# Patient Record
Sex: MALE | URBAN - METROPOLITAN AREA
[De-identification: names, ages, dates, MRNs, and addresses within clinical notes are randomized per-mention and may not be internally consistent; named-entity substitution may affect disease eponyms.]

---

## 2017-09-12 ENCOUNTER — EMERGENCY (EMERGENCY)
Facility: HOSPITAL | Age: 32
LOS: 1 days | Discharge: HOME | End: 2017-09-12
Attending: EMERGENCY MEDICINE | Admitting: EMERGENCY MEDICINE
Payer: COMMERCIAL

## 2017-09-12 VITALS
TEMPERATURE: 98 F | HEART RATE: 86 BPM | OXYGEN SATURATION: 100 % | DIASTOLIC BLOOD PRESSURE: 86 MMHG | RESPIRATION RATE: 18 BRPM | SYSTOLIC BLOOD PRESSURE: 154 MMHG

## 2017-09-12 LAB
ALBUMIN SERPL ELPH-MCNC: 4.5 G/DL — SIGNIFICANT CHANGE UP (ref 3.3–5)
ALP SERPL-CCNC: 77 U/L — SIGNIFICANT CHANGE UP (ref 40–120)
ALT FLD-CCNC: 115 U/L — HIGH (ref 4–41)
AST SERPL-CCNC: 58 U/L — HIGH (ref 4–40)
BASOPHILS # BLD AUTO: 0.05 K/UL — SIGNIFICANT CHANGE UP (ref 0–0.2)
BASOPHILS NFR BLD AUTO: 0.6 % — SIGNIFICANT CHANGE UP (ref 0–2)
BILIRUB SERPL-MCNC: 0.7 MG/DL — SIGNIFICANT CHANGE UP (ref 0.2–1.2)
BUN SERPL-MCNC: 10 MG/DL — SIGNIFICANT CHANGE UP (ref 7–23)
CALCIUM SERPL-MCNC: 9.1 MG/DL — SIGNIFICANT CHANGE UP (ref 8.4–10.5)
CHLORIDE SERPL-SCNC: 103 MMOL/L — SIGNIFICANT CHANGE UP (ref 98–107)
CO2 SERPL-SCNC: 25 MMOL/L — SIGNIFICANT CHANGE UP (ref 22–31)
CREAT SERPL-MCNC: 1.07 MG/DL — SIGNIFICANT CHANGE UP (ref 0.5–1.3)
EOSINOPHIL # BLD AUTO: 0.04 K/UL — SIGNIFICANT CHANGE UP (ref 0–0.5)
EOSINOPHIL NFR BLD AUTO: 0.5 % — SIGNIFICANT CHANGE UP (ref 0–6)
GLUCOSE SERPL-MCNC: 94 MG/DL — SIGNIFICANT CHANGE UP (ref 70–99)
HCT VFR BLD CALC: 49.4 % — SIGNIFICANT CHANGE UP (ref 39–50)
HGB BLD-MCNC: 16.5 G/DL — SIGNIFICANT CHANGE UP (ref 13–17)
IMM GRANULOCYTES # BLD AUTO: 0.02 # — SIGNIFICANT CHANGE UP
IMM GRANULOCYTES NFR BLD AUTO: 0.2 % — SIGNIFICANT CHANGE UP (ref 0–1.5)
LYMPHOCYTES # BLD AUTO: 1.88 K/UL — SIGNIFICANT CHANGE UP (ref 1–3.3)
LYMPHOCYTES # BLD AUTO: 22.9 % — SIGNIFICANT CHANGE UP (ref 13–44)
MCHC RBC-ENTMCNC: 28.5 PG — SIGNIFICANT CHANGE UP (ref 27–34)
MCHC RBC-ENTMCNC: 33.4 % — SIGNIFICANT CHANGE UP (ref 32–36)
MCV RBC AUTO: 85.3 FL — SIGNIFICANT CHANGE UP (ref 80–100)
MONOCYTES # BLD AUTO: 0.7 K/UL — SIGNIFICANT CHANGE UP (ref 0–0.9)
MONOCYTES NFR BLD AUTO: 8.5 % — SIGNIFICANT CHANGE UP (ref 2–14)
NEUTROPHILS # BLD AUTO: 5.53 K/UL — SIGNIFICANT CHANGE UP (ref 1.8–7.4)
NEUTROPHILS NFR BLD AUTO: 67.3 % — SIGNIFICANT CHANGE UP (ref 43–77)
NRBC # FLD: 0 — SIGNIFICANT CHANGE UP
PLATELET # BLD AUTO: 242 K/UL — SIGNIFICANT CHANGE UP (ref 150–400)
PMV BLD: 10.6 FL — SIGNIFICANT CHANGE UP (ref 7–13)
POTASSIUM SERPL-MCNC: 4.3 MMOL/L — SIGNIFICANT CHANGE UP (ref 3.5–5.3)
POTASSIUM SERPL-SCNC: 4.3 MMOL/L — SIGNIFICANT CHANGE UP (ref 3.5–5.3)
PROT SERPL-MCNC: 7.3 G/DL — SIGNIFICANT CHANGE UP (ref 6–8.3)
RBC # BLD: 5.79 M/UL — SIGNIFICANT CHANGE UP (ref 4.2–5.8)
RBC # FLD: 13 % — SIGNIFICANT CHANGE UP (ref 10.3–14.5)
SODIUM SERPL-SCNC: 142 MMOL/L — SIGNIFICANT CHANGE UP (ref 135–145)
TROPONIN T SERPL-MCNC: < 0.06 NG/ML — SIGNIFICANT CHANGE UP (ref 0–0.06)
WBC # BLD: 8.22 K/UL — SIGNIFICANT CHANGE UP (ref 3.8–10.5)
WBC # FLD AUTO: 8.22 K/UL — SIGNIFICANT CHANGE UP (ref 3.8–10.5)

## 2017-09-12 PROCEDURE — 71020: CPT | Mod: 26

## 2017-09-12 PROCEDURE — 99284 EMERGENCY DEPT VISIT MOD MDM: CPT

## 2017-09-12 RX ORDER — FAMOTIDINE 10 MG/ML
1 INJECTION INTRAVENOUS
Qty: 20 | Refills: 0 | OUTPATIENT
Start: 2017-09-12 | End: 2017-09-22

## 2017-09-12 RX ORDER — FAMOTIDINE 10 MG/ML
20 INJECTION INTRAVENOUS ONCE
Qty: 0 | Refills: 0 | Status: COMPLETED | OUTPATIENT
Start: 2017-09-12 | End: 2017-09-12

## 2017-09-12 RX ADMIN — Medication 30 MILLILITER(S): at 13:40

## 2017-09-12 RX ADMIN — FAMOTIDINE 20 MILLIGRAM(S): 10 INJECTION INTRAVENOUS at 13:40

## 2017-09-12 NOTE — ED PROVIDER NOTE - CARE PLAN
Principal Discharge DX:	Shortness of breath  Instructions for follow-up, activity and diet:	1. FOLLOW UP WITH YOUR PRIMARY DOCTOR WITHIN 5 DAYS.  2. TAKE ALL MEDICATIONS AS DIRECTED.  3. YOU WERE GIVEN COPIES OF YOUR LABS--FOLLOW UP WITH YOUR DOCTOR REGARDING MILD ELEVATION IN YOUR LIVER ENZYMES.  4. RETURN TO THE ER FOR ANY WORSENING SYMPTOMS.

## 2017-09-12 NOTE — ED PROVIDER NOTE - MEDICAL DECISION MAKING DETAILS
2-3 of chest pain and SOB, EKG without ischemic changes; low suspicion for ACS, more consistent with likely GERD/gastritis but will send CE x 1 due to father with MI in early 50s 2-3 of chest pain and SOB, EKG without ischemic changes; low suspicion for ACS, more consistent with likely GERD/gastritis but will send CE x 1 due to father with MI in early 50s; treat for GERD symptoms

## 2017-09-12 NOTE — ED PROVIDER NOTE - OBJECTIVE STATEMENT
33 yo male with no sig PMH in ED with 2-3 weeks of intermittent migratory chest discomfort and epigastric discomfort, associated with SOB.  Currently episode of discomfort has been constant since yesterday.  Pt notes that for the past few days he has noticed increased symptoms of what he thinks are acid reflux symptoms.  Symptoms are not related to food intake, not positional.  Symptoms non-exertional, no associated other symptoms.  Took an antacid last night for the first time before bed.  Father with history of MI in early 50s.

## 2017-09-12 NOTE — ED SUB INTERN NOTE - PHYSICAL EXAMINATION
General: well-appearing, answering questions appropriately, NAD  HEENT: AT/NC, PERRL/EOMI, normal conjunctiva, normal TMs bilaterally, no rhinorrhea, normal nasal turbinates, minimal pharyngeal erythema  CV: peripheral pulses 2+ bilaterally, no MRG, RRR, S1, S2, no extra heart sounds, capillary refill <2 seconds  Pulmonary/Chest: nontender anterior/posterior chest, CTAB, no wheezes/rhonchi/crackles  Abdomen/GI: soft, nontender, nondistended, no scars, no rashes, no masses, no organomegaly  : no CVA tenderness, no discharge  MSK: FROM to BUE and BLE, 5/5 motor strength to all extremities  Neuro: AOx3, sensation intact & symmetric bilaterally, normal gait  Psych: normal mood and affect

## 2017-09-12 NOTE — ED SUB INTERN NOTE - FAMILY HISTORY
Father  Still living? Yes, Estimated age: Age Unknown  Family history of myocardial infarction at age less than 60, Age at diagnosis: Age Unknown

## 2017-09-12 NOTE — ED SUB INTERN NOTE - MEDICAL DECISION MAKING DETAILS
due to FHx and progression of symptoms will w/u for cardiac etiology (EKG, trop); likely GERD, seasonal allergy presentation if normal cardiac; will treat with PPI trial

## 2017-09-12 NOTE — ED SUB INTERN NOTE - OBJECTIVE STATEMENT FT
31 yo male with no pertinet medical hx presents to ED with CC CP with SOB for the past 2 weeks. He reports CP has been intermittent to the left side of the chest, the left lower ribs, and his upper chest/neck, and is not associated with specific activities. He describes pain as pressure to those areas. He also reports SOB worse with exertion. He reports taking antacid last night with no relief of symptoms. He has FHx of father with MI at age 50.

## 2017-09-12 NOTE — ED PROVIDER NOTE - PROGRESS NOTE DETAILS
ED Attending Dr. Cross: pt feeling well, in NAD; labs noted to show troponin normal (with constant symptoms since yesterday); labs seen to show mild elevation in LFTs, unclear etiology--will give pt copies of labs and have him follow up with PMD RE: ED presentation/symptoms and incidental finding of LFT elevation

## 2017-09-12 NOTE — ED PROVIDER NOTE - PLAN OF CARE
1. FOLLOW UP WITH YOUR PRIMARY DOCTOR WITHIN 5 DAYS.  2. TAKE ALL MEDICATIONS AS DIRECTED.  3. YOU WERE GIVEN COPIES OF YOUR LABS--FOLLOW UP WITH YOUR DOCTOR REGARDING MILD ELEVATION IN YOUR LIVER ENZYMES.  4. RETURN TO THE ER FOR ANY WORSENING SYMPTOMS.

## 2017-09-12 NOTE — ED SUB INTERN NOTE - PROGRESS NOTE DETAILS
patient with no pain at this time, seated comfortably at results waiting; tolerated IVF and famotidine dose; will be updated with CXR and bloodwork results; negative trop and EKG; will likely be DC'd with PPI rx for GERD

## 2019-11-12 ENCOUNTER — APPOINTMENT (OUTPATIENT)
Dept: DERMATOLOGY | Facility: CLINIC | Age: 34
End: 2019-11-12

## 2019-11-14 ENCOUNTER — APPOINTMENT (OUTPATIENT)
Dept: DERMATOLOGY | Facility: CLINIC | Age: 34
End: 2019-11-14

## 2021-02-28 ENCOUNTER — LABORATORY RESULT (OUTPATIENT)
Age: 36
End: 2021-02-28

## 2021-03-01 ENCOUNTER — NON-APPOINTMENT (OUTPATIENT)
Age: 36
End: 2021-03-01

## 2021-03-01 ENCOUNTER — APPOINTMENT (OUTPATIENT)
Dept: DERMATOLOGY | Facility: CLINIC | Age: 36
End: 2021-03-01
Payer: COMMERCIAL

## 2021-03-01 VITALS — BODY MASS INDEX: 34.36 KG/M2 | WEIGHT: 240 LBS | HEIGHT: 70 IN

## 2021-03-01 DIAGNOSIS — L26 EXFOLIATIVE DERMATITIS: ICD-10-CM

## 2021-03-01 DIAGNOSIS — Z79.899 OTHER LONG TERM (CURRENT) DRUG THERAPY: ICD-10-CM

## 2021-03-01 DIAGNOSIS — D48.5 NEOPLASM OF UNCERTAIN BEHAVIOR OF SKIN: ICD-10-CM

## 2021-03-01 DIAGNOSIS — L60.3 NAIL DYSTROPHY: ICD-10-CM

## 2021-03-01 DIAGNOSIS — B35.1 TINEA UNGUIUM: ICD-10-CM

## 2021-03-01 PROBLEM — Z00.00 ENCOUNTER FOR PREVENTIVE HEALTH EXAMINATION: Status: ACTIVE | Noted: 2021-03-01

## 2021-03-01 PROCEDURE — 99204 OFFICE O/P NEW MOD 45 MIN: CPT | Mod: GC,25

## 2021-03-01 PROCEDURE — 99072 ADDL SUPL MATRL&STAF TM PHE: CPT

## 2021-03-01 PROCEDURE — 11102 TANGNTL BX SKIN SINGLE LES: CPT | Mod: GC

## 2021-03-01 RX ORDER — CICLOPIROX 80 MG/ML
8 SOLUTION TOPICAL
Qty: 1 | Refills: 6 | Status: ACTIVE | COMMUNITY
Start: 2021-03-01 | End: 1900-01-01

## 2021-03-05 ENCOUNTER — NON-APPOINTMENT (OUTPATIENT)
Age: 36
End: 2021-03-05

## 2021-03-09 ENCOUNTER — NON-APPOINTMENT (OUTPATIENT)
Age: 36
End: 2021-03-09

## 2021-03-10 LAB
ALBUMIN SERPL ELPH-MCNC: 4.4 G/DL
ALP BLD-CCNC: 99 U/L
ALT SERPL-CCNC: 93 U/L
ANION GAP SERPL CALC-SCNC: 13 MMOL/L
AST SERPL-CCNC: 51 U/L
BILIRUB SERPL-MCNC: 0.5 MG/DL
BUN SERPL-MCNC: 10 MG/DL
CALCIUM SERPL-MCNC: 9.3 MG/DL
CHLORIDE SERPL-SCNC: 102 MMOL/L
CO2 SERPL-SCNC: 26 MMOL/L
CREAT SERPL-MCNC: 0.96 MG/DL
GLUCOSE SERPL-MCNC: 81 MG/DL
POTASSIUM SERPL-SCNC: 3.8 MMOL/L
PROT SERPL-MCNC: 6.8 G/DL
SODIUM SERPL-SCNC: 141 MMOL/L

## 2022-02-25 ENCOUNTER — OUTPATIENT (OUTPATIENT)
Dept: OUTPATIENT SERVICES | Facility: HOSPITAL | Age: 37
LOS: 1 days | End: 2022-02-25
Payer: COMMERCIAL

## 2022-02-25 DIAGNOSIS — Z11.52 ENCOUNTER FOR SCREENING FOR COVID-19: ICD-10-CM

## 2022-02-25 LAB — SARS-COV-2 RNA SPEC QL NAA+PROBE: SIGNIFICANT CHANGE UP

## 2022-02-25 PROCEDURE — C9803: CPT

## 2022-02-25 PROCEDURE — U0005: CPT

## 2022-02-25 PROCEDURE — U0003: CPT
